# Patient Record
Sex: MALE | Race: WHITE
[De-identification: names, ages, dates, MRNs, and addresses within clinical notes are randomized per-mention and may not be internally consistent; named-entity substitution may affect disease eponyms.]

---

## 2017-12-17 ENCOUNTER — HOSPITAL ENCOUNTER (EMERGENCY)
Dept: HOSPITAL 17 - NEPD | Age: 82
LOS: 1 days | Discharge: HOME | End: 2017-12-18
Payer: MEDICARE

## 2017-12-17 VITALS
OXYGEN SATURATION: 96 % | RESPIRATION RATE: 18 BRPM | DIASTOLIC BLOOD PRESSURE: 66 MMHG | SYSTOLIC BLOOD PRESSURE: 127 MMHG | HEART RATE: 58 BPM

## 2017-12-17 VITALS
SYSTOLIC BLOOD PRESSURE: 157 MMHG | TEMPERATURE: 98.6 F | DIASTOLIC BLOOD PRESSURE: 71 MMHG | RESPIRATION RATE: 16 BRPM | OXYGEN SATURATION: 97 % | HEART RATE: 88 BPM

## 2017-12-17 VITALS — HEIGHT: 72 IN | WEIGHT: 187.39 LBS | BODY MASS INDEX: 25.38 KG/M2

## 2017-12-17 DIAGNOSIS — J45.909: ICD-10-CM

## 2017-12-17 DIAGNOSIS — Z79.899: ICD-10-CM

## 2017-12-17 DIAGNOSIS — Z85.46: ICD-10-CM

## 2017-12-17 DIAGNOSIS — Z88.8: ICD-10-CM

## 2017-12-17 DIAGNOSIS — M19.90: ICD-10-CM

## 2017-12-17 DIAGNOSIS — E07.9: ICD-10-CM

## 2017-12-17 DIAGNOSIS — J32.0: ICD-10-CM

## 2017-12-17 DIAGNOSIS — G44.201: Primary | ICD-10-CM

## 2017-12-17 PROCEDURE — 80048 BASIC METABOLIC PNL TOTAL CA: CPT

## 2017-12-17 PROCEDURE — 70450 CT HEAD/BRAIN W/O DYE: CPT

## 2017-12-17 PROCEDURE — 96374 THER/PROPH/DIAG INJ IV PUSH: CPT

## 2017-12-17 PROCEDURE — 85025 COMPLETE CBC W/AUTO DIFF WBC: CPT

## 2017-12-17 PROCEDURE — 99285 EMERGENCY DEPT VISIT HI MDM: CPT

## 2017-12-17 PROCEDURE — 96361 HYDRATE IV INFUSION ADD-ON: CPT

## 2017-12-17 PROCEDURE — 72125 CT NECK SPINE W/O DYE: CPT

## 2017-12-17 NOTE — RADRPT
EXAM DATE/TIME:  12/17/2017 22:52 

 

[facility] COMPARISON:     

No previous studies available for comparison.

 

 

INDICATIONS :     

Headaches after chiropractic adjustment.

                      

 

RADIATION DOSE:     

56.35 CTDIvol (mGy) 

 

 

 

MEDICAL HISTORY :     

Carcinoma, prostate.  

 

SURGICAL HISTORY :      

Cholecystectomy. Colostomy.Urostomy

 

ENCOUNTER:      

Initial

 

ACUITY:      

2 days

 

PAIN SCALE:      

6/10

 

LOCATION:        

neck 

 

TECHNIQUE:     

Multiple contiguous axial images were obtained of the head.  Using automated exposure control and adj
ustment of the mA and/or kV according to patient size, radiation dose was kept as low as reasonably a
chievable to obtain optimal diagnostic quality images.   DICOM format image data is available electro
nically for review and comparison.  

 

FINDINGS:     

 

CEREBRUM:     

The ventricles and cortical sulci are widened.   No evidence of midline shift, mass lesion, hemorrhag
e or acute infarction.  No extra-axial fluid collections are seen.

 

POSTERIOR FOSSA:     

The cerebellum and brainstem are intact.  The 4th ventricle is midline.  The cerebellopontine angle i
s unremarkable.

 

EXTRACRANIAL:     

The visualized portion of the orbits is intact. There is mild maxillary sinus mucosal disease.

 

SKULL:     

The calvaria is intact.  No evidence of skull fracture.

 

CONCLUSION:     

1. No acute intracranial abnormality.

2. Mild age-related atrophy.

3. Mild maxillary sinus disease.

 

 

 

 Toñito Persaud MD on December 17, 2017 at 23:12           

Board Certified Radiologist.

 This report was verified electronically.

## 2017-12-17 NOTE — PD
HPI


Chief Complaint:  Headache


Time Seen by Provider:  22:37


Travel History


International Travel<30 days:  No


Contact w/Intl Traveler<30days:  No


Traveled to known affect area:  No





History of Present Illness


HPI


84-year-old male came to the emergency room with an intractable headache that 

started 2 weeks ago.  Patient says that he identifies the origin of this 

headache since he had a neck manipulation done by a chiropractor.  He went back 

to see the chiropractor after couple days since the headache persisted.  The 

neck was further manipulated.  This was followed by going to a massage 

therapist.  Meanwhile he also took Excedrin Migraine pill along with some of 

his Maxalt headache pills that were 12 years old.  All these provided temporary 

relief but the headache would always come back.  He also took Fioricet 3 which 

would relieve the headache for 12 hours but then always come back.  His 

headache as 6 out of 10 currently.  It originates from the back of his neck and 

goes all the way from his occiput to the vertex.  He says that if he does not 

take any medication to relieve it then it would start becoming a bandlike pain 

around his head.  He identifies these headaches similar to his spinal headache.

  Vital signs are otherwise stable.  Patient wanted to tell me that in an event 

something happened to him he is a DNR and does not wish any life-prolonging 

measures to be done.





[facility]


Past Medical History


*** Narrative Medical


List of his past medical, surgical, social and family history is reviewed from 

the nursing note.


Arthritis:  Yes (MINOR)


Asthma:  Yes (ASTHMATIC REACTION TO ALLERGIES)


Cancer:  Yes (PROSTATE)


Cardiovascular Problems:  No


Endocrine:  Yes


Genitourinary:  Yes


Headaches:  Yes


Immune Disorder:  No


Musculoskeletal:  Yes


Neurologic:  Yes


Psychiatric:  No


Reproductive:  No


Respiratory:  Yes


Migraines:  Yes


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


Thyroid Disease:  Yes





Past Surgical History


Abdominal Surgery:  Yes (COLOSTOMY)


Cholecystectomy:  Yes


Genitourinary Surgery:  Yes (3 TURPS, PROSTATE RADIATION THERAPY, UROSTOMY)


Other Surgery:  Yes





Social History


Alcohol Use:  No


Tobacco Use:  No


Substance Use:  No





Allergies-Medications


(Allergen,Severity, Reaction):  


Coded Allergies:  


     methylphenidate (Unverified  Allergy, Unknown, HALLUCINATIONS, 12/17/17)


Comments


List of his allergies reviewed from the nursing note.


Reported Meds & Prescriptions





Reported Meds & Active Scripts


Active


Reported


Temazepam 15 Mg Cap 15 Mg PO HS PRN


Lisinopril 10 Mg Tab 10 Mg PO DAILY


Simvastatin 40 Mg Tab 40 Mg PO HS


Gabapentin 600 Mg Tab 600 Mg PO BID


Levothyroxine (Levothyroxine Sodium) 150 Mcg Tab 150 Mcg PO DAILY





Narrative Medication


List of his home medications reviewed from the nursing note.





Review of Systems


Except as stated in HPI:  all other systems reviewed are Neg


Neurologic:  Positive: Headache





Physical Exam


Narrative


GENERAL: Awake, alert, mild distress


SKIN: Focused skin assessment warm/dry.


HEAD: Atraumatic. Normocephalic. 


EYES: Pupils equal and round. No scleral icterus. No injection or drainage. 


ENT: No nasal bleeding or discharge.  Mucous membranes pink and moist.


NECK: Trachea midline. No JVD.  No midline neck tenderness


CARDIOVASCULAR: Regular rate and rhythm.  No murmur appreciated.


RESPIRATORY: No accessory muscle use. Clear to auscultation. Breath sounds 

equal bilaterally. 


GASTROINTESTINAL: Abdomen soft, non-tender, nondistended. Hepatic and splenic 

margins not palpable. 


MUSCULOSKELETAL: No obvious deformities. No clubbing.  No cyanosis.  No edema. 


NEUROLOGICAL: Awake and alert. No obvious cranial nerve deficits.  Motor 

grossly within normal limits. Normal speech.


PSYCHIATRIC: Appropriate mood and affect; insight and judgment normal.





Data


Data


Last Documented VS





Orders





 Orders


Complete Blood Count With Diff (12/17/17 22:44)


Basic Metabolic Panel (Bmp) (12/17/17 22:44)


Ct Brain W/O Iv Contrast(Rout) (12/17/17 22:44)


Ecg Monitoring (12/17/17 22:44)


Iv Access Insert/Monitor (12/17/17 22:44)


Oximetry (12/17/17 22:44)


Sodium Chloride 0.9% Flush (Ns Flush) (12/17/17 22:45)


Prochlorperazine Inj (Compazine Inj) (12/17/17 22:45)


Sodium Chlorid 0.9% 500 Ml Inj (Ns 500 M (12/17/17 22:45)


Ct Cerv Spine W/O Contrast (12/17/17 )


Sodium Chlorid 0.9% 500 Ml Inj (Ns 500 M (12/18/17 00:30)


Ed Discharge Order (12/18/17 00:26)





Labs





Laboratory Tests








Test


  12/17/17


23:15


 


White Blood Count 7.5 TH/MM3 


 


Red Blood Count 4.61 MIL/MM3 


 


Hemoglobin 15.1 GM/DL 


 


Hematocrit 43.6 % 


 


Mean Corpuscular Volume 94.5 FL 


 


Mean Corpuscular Hemoglobin 32.8 PG 


 


Mean Corpuscular Hemoglobin


Concent 34.7 % 


 


 


Red Cell Distribution Width 15.3 % 


 


Platelet Count 159 TH/MM3 


 


Mean Platelet Volume 8.7 FL 


 


Neutrophils (%) (Auto) 67.3 % 


 


Lymphocytes (%) (Auto) 18.7 % 


 


Monocytes (%) (Auto) 8.8 % 


 


Eosinophils (%) (Auto) 4.7 % 


 


Basophils (%) (Auto) 0.5 % 


 


Neutrophils # (Auto) 5.1 TH/MM3 


 


Lymphocytes # (Auto) 1.4 TH/MM3 


 


Monocytes # (Auto) 0.7 TH/MM3 


 


Eosinophils # (Auto) 0.4 TH/MM3 


 


Basophils # (Auto) 0.0 TH/MM3 


 


CBC Comment DIFF FINAL 


 


Differential Comment  


 


Blood Urea Nitrogen 24 MG/DL 


 


Creatinine 1.76 MG/DL 


 


Random Glucose 109 MG/DL 


 


Calcium Level 8.7 MG/DL 


 


Sodium Level 140 MEQ/L 


 


Potassium Level 4.3 MEQ/L 


 


Chloride Level 106 MEQ/L 


 


Carbon Dioxide Level 27.6 MEQ/L 


 


Anion Gap 6 MEQ/L 


 


Estimat Glomerular Filtration


Rate 37 ML/MIN 


 











MDM


Medical Decision Making


Medical Screen Exam Complete:  Yes


Emergency Medical Condition:  Yes


Medical Record Reviewed:  Yes


Differential Diagnosis


Intractable headache, cervical fracture from neck manipulation


Narrative Course


11:04 PM I have ordered Compazine and IV fluid bolus for the headache.  

Awaiting for blood test and the CT scan of his head and neck to be done and 

resulted.





11:52 PM CT scans are back and within normal limit.  Awaiting for the blood 

test result.





12:23 AM blood test results of back and patient has some renal insufficiency.  

The last blood test I have in our system to compare with is from 2011 when the 

patient's renal function was optimal.  I have ordered for another bolus of 500 

cc.  I've discussed this test results with him.  I have recommended the patient 

should not have any more spine manipulation by chiropractor.  Patient 

understands.  I have said that if his headache returns his primary care should 

refer him to a neurologist for further management.  He will be discharged home.

  His headache has significantly improved as per the patient.  It's down to 2 

out of 10.





Procedures


EKG Prior to Arrival:  No





Diagnosis





 Primary Impression:  


 Intractable headache


 Qualified Codes:  G44.201 - Tension-type headache, unspecified, intractable


 Additional Impression:  


 Tension headache


Referrals:  


Primary Care Physician


1 week





***Additional Instructions:  


Stop taking any over-the-counter headache medication drink coffee or 

caffeinated beverages.  Stay hydrated.  Follow-up with your primary care 

especially if the headache returns in which case he'll primary care should 

refer you to a neurologist further headache management.  He should not have any 

more spine manipulation by a chiropractor or anyone else.


Disposition:  01 DISCHARGE HOME


Condition:  Stable











Hyun Parra MD Dec 17, 2017 22:40

## 2017-12-17 NOTE — RADRPT
EXAM DATE/TIME:  12/17/2017 22:54 

 

[facility] COMPARISON:     

No previous studies available for comparison.

 

 

INDICATIONS :     

Headaches after chiropractic adjustment.

                      

 

RADIATION DOSE:     

33.86 CTDIvol (mGy) 

 

 

 

MEDICAL HISTORY :     

Carcinoma, prostate.  

 

SURGICAL HISTORY :      

Cholecystectomy. Colostomy.Urostomy

 

ENCOUNTER:      

Initial

 

ACUITY:      

2 days

 

PAIN SCALE:      

6/10

 

LOCATION:        

neck 

 

TECHNIQUE:     

Volumetric scanning of the cervical spine was performed. Multiplanar reconstructions in the sagittal,
 coronal and oblique axial planes were performed.   Using automated exposure control and adjustment o
f the mA and/or kV according to patient size, radiation dose was kept as low as reasonably achievable
 to obtain optimal diagnostic quality images.   DICOM format image data is available electronically f
or review and comparison.  

 

FINDINGS:     

 

VERTEBRAE:     

Normal vertebral body height.

 

ALIGNMENT:     

No evidence of subluxation.

 

C2-C3:  

The bony spinal canal is normal in size.  No evidence of disc bulge or herniation.  The neural forami
na are bilaterally patent.

 

C3-C4:  

The disc space is mildly decreased in height. There is slight bulging and osteophytic ridging without
 significant stenosis. The bony spinal canal is normal in size.  The neural foramina are bilaterally 
patent.

 

C4-C5:  

The disc space is mildly decreased in height. There is slight bulging and osteophytic ridging without
 significant stenosis. The bony spinal canal is normal in size.  There is uncovertebral hypertrophy. 
The neural foramina are bilaterally patent.

 

C5-C6:  

The disc space is mildly decreased in height. There is slight bulging and osteophytic ridging without
 significant stenosis. The bony spinal canal is normal in size.  There is uncovertebral hypertrophy. 
The neural foramina are bilaterally patent.

 

C6-C7:  

The disc space is mildly decreased in height. There is slight bulging and osteophytic ridging without
 significant stenosis. The bony spinal canal is normal in size.  There is uncovertebral hypertrophy. 
The neural foramina are bilaterally patent.

 

C7-T1:  

The bony spinal canal is normal in size.  No evidence of disc bulge or herniation.  The neural forami
na are bilaterally patent.

 

CONCLUSION:     

1. No acute bony abnormality is seen.

2. Degenerative change as described above.

 

 

 

 Toñito Persaud MD on December 17, 2017 at 23:14           

Board Certified Radiologist.

 This report was verified electronically.

## 2017-12-18 LAB
BASOPHILS # BLD AUTO: 0 TH/MM3 (ref 0–0.2)
BASOPHILS NFR BLD: 0.5 % (ref 0–2)
BUN SERPL-MCNC: 24 MG/DL (ref 7–18)
CALCIUM SERPL-MCNC: 8.7 MG/DL (ref 8.5–10.1)
CHLORIDE SERPL-SCNC: 106 MEQ/L (ref 98–107)
CREAT SERPL-MCNC: 1.76 MG/DL (ref 0.6–1.3)
EOSINOPHIL # BLD: 0.4 TH/MM3 (ref 0–0.4)
EOSINOPHIL NFR BLD: 4.7 % (ref 0–4)
ERYTHROCYTE [DISTWIDTH] IN BLOOD BY AUTOMATED COUNT: 15.3 % (ref 11.6–17.2)
GFR SERPLBLD BASED ON 1.73 SQ M-ARVRAT: 37 ML/MIN (ref 89–?)
GLUCOSE SERPL-MCNC: 109 MG/DL (ref 74–106)
HCO3 BLD-SCNC: 27.6 MEQ/L (ref 21–32)
HCT VFR BLD CALC: 43.6 % (ref 39–51)
HGB BLD-MCNC: 15.1 GM/DL (ref 13–17)
LYMPHOCYTES # BLD AUTO: 1.4 TH/MM3 (ref 1–4.8)
LYMPHOCYTES NFR BLD AUTO: 18.7 % (ref 9–44)
MCH RBC QN AUTO: 32.8 PG (ref 27–34)
MCHC RBC AUTO-ENTMCNC: 34.7 % (ref 32–36)
MCV RBC AUTO: 94.5 FL (ref 80–100)
MONOCYTE #: 0.7 TH/MM3 (ref 0–0.9)
MONOCYTES NFR BLD: 8.8 % (ref 0–8)
NEUTROPHILS # BLD AUTO: 5.1 TH/MM3 (ref 1.8–7.7)
NEUTROPHILS NFR BLD AUTO: 67.3 % (ref 16–70)
PLATELET # BLD: 159 TH/MM3 (ref 150–450)
PMV BLD AUTO: 8.7 FL (ref 7–11)
RBC # BLD AUTO: 4.61 MIL/MM3 (ref 4.5–5.9)
SODIUM SERPL-SCNC: 140 MEQ/L (ref 136–145)
WBC # BLD AUTO: 7.5 TH/MM3 (ref 4–11)

## 2018-02-22 ENCOUNTER — HOSPITAL ENCOUNTER (OUTPATIENT)
Dept: HOSPITAL 17 - PHRSP | Age: 83
End: 2018-02-22
Attending: INTERNAL MEDICINE
Payer: COMMERCIAL

## 2018-02-22 DIAGNOSIS — R91.8: Primary | ICD-10-CM

## 2018-02-22 PROCEDURE — 36600 WITHDRAWAL OF ARTERIAL BLOOD: CPT

## 2018-02-22 PROCEDURE — 94729 DIFFUSING CAPACITY: CPT

## 2018-02-22 PROCEDURE — 82805 BLOOD GASES W/O2 SATURATION: CPT

## 2018-02-22 PROCEDURE — 94060 EVALUATION OF WHEEZING: CPT

## 2018-02-22 PROCEDURE — 94618 PULMONARY STRESS TESTING: CPT

## 2018-02-22 PROCEDURE — 94726 PLETHYSMOGRAPHY LUNG VOLUMES: CPT
